# Patient Record
Sex: MALE | Race: OTHER
[De-identification: names, ages, dates, MRNs, and addresses within clinical notes are randomized per-mention and may not be internally consistent; named-entity substitution may affect disease eponyms.]

---

## 2018-03-06 ENCOUNTER — HOSPITAL ENCOUNTER (OUTPATIENT)
Dept: HOSPITAL 89 - OR | Age: 58
Discharge: HOME | End: 2018-03-06
Attending: SURGERY
Payer: COMMERCIAL

## 2018-03-06 VITALS — SYSTOLIC BLOOD PRESSURE: 122 MMHG | DIASTOLIC BLOOD PRESSURE: 92 MMHG

## 2018-03-06 VITALS — SYSTOLIC BLOOD PRESSURE: 135 MMHG | DIASTOLIC BLOOD PRESSURE: 97 MMHG

## 2018-03-06 VITALS — DIASTOLIC BLOOD PRESSURE: 94 MMHG | SYSTOLIC BLOOD PRESSURE: 131 MMHG

## 2018-03-06 VITALS — DIASTOLIC BLOOD PRESSURE: 95 MMHG | SYSTOLIC BLOOD PRESSURE: 135 MMHG

## 2018-03-06 VITALS — DIASTOLIC BLOOD PRESSURE: 100 MMHG | SYSTOLIC BLOOD PRESSURE: 131 MMHG

## 2018-03-06 VITALS — WEIGHT: 176 LBS | HEIGHT: 69 IN | BODY MASS INDEX: 26.07 KG/M2

## 2018-03-06 VITALS — DIASTOLIC BLOOD PRESSURE: 96 MMHG | SYSTOLIC BLOOD PRESSURE: 128 MMHG

## 2018-03-06 DIAGNOSIS — K63.5: ICD-10-CM

## 2018-03-06 DIAGNOSIS — Z12.11: Primary | ICD-10-CM

## 2018-03-06 LAB — PLATELET COUNT, AUTOMATED: 202 K/UL (ref 150–450)

## 2018-03-06 PROCEDURE — 85025 COMPLETE CBC W/AUTO DIFF WBC: CPT

## 2018-03-06 PROCEDURE — 36415 COLL VENOUS BLD VENIPUNCTURE: CPT

## 2018-03-06 PROCEDURE — 00811 ANES LWR INTST NDSC NOS: CPT

## 2018-03-06 PROCEDURE — 88305 TISSUE EXAM BY PATHOLOGIST: CPT

## 2018-03-06 PROCEDURE — 49505 PRP I/HERN INIT REDUC >5 YR: CPT

## 2018-03-06 PROCEDURE — 45380 COLONOSCOPY AND BIOPSY: CPT

## 2018-03-06 NOTE — POST OPERATIVE PROGRESS NOTE
Post Operative Progress Note


Date:  Mar 6, 2018


Time:  08:23


Surgeon:  


stacey


Anesthesia:  


dr baca


Pre-Op Diagnosis:  


right inguinal hernia and screening colonoscopy


Post-Op Diagnosis:  


direct inguinal hernia and 2 mm polyp in right colon


Procedure(s):  


right inguinal hernia repair, kugel and colonoscopy with polypectomy











CHANDAN NJ MD Mar 6, 2018 08:24

## 2018-03-06 NOTE — OPERATIVE REPORT 1
EVENT DATE:  March 6, 2018

SURGEON:  Martín Sandy MD

ANESTHESIOLOGIST:  Demond Aguilar MD

ANESTHESIA:  General.





PREOPERATIVE DIAGNOSIS  

Right inguinal hernia.



POSTOPERATIVE DIAGNOSIS 

Right direct inguinal hernia.



PROCEDURE PERFORMED 

Right inguinal hernia repair, Kugel technique.



DESCRIPTION OF PROCEDURE 

The patient was placed in the supine position and given a general anesthetic.  
After the colonoscopy, the lower abdomen and genitalia were prepped and draped 
in a sterile fashion.  We made a skin incision half way between the anterior 
superior iliac spine and pubic tubercle, 1 cm above this.  Following skin lines
, it was carried down through the skin and subcutaneous tissue to the muscle-
splitting incision through the external and internal oblique and incised the 
transversalis fascia vertically.  We raised the inferior epigastric vessels, 
dissecting the preperitoneal space.  We dissected out the pubic tubercle and 
Eitan ligament.  He had a direct inguinal hernia that was dissected out.  We 
created room for the mesh with blunt dissection.  We dissected the peritoneum 
off the cord structures back past where the vas deferens and the cord 
structures bifurcated.  When we put tension on the testicle, there was no 
tension on the peritoneum.  We then laid a medium Kugel patch into position and 
covered the pubic tubercle and Eitan ligament and extended past the internal 
ring.  There were no sharp angulations.  The transversalis fascia was then 
reapproximated with interrupted 3-0 Vicryl incorporating the mesh.  The 
internal oblique was closed with running 3-0 Vicryl.  The external oblique was 
closed with running 3-0 Vicryl.  We injected 20 mL of 0.2% ropivacaine in the 
muscle and subcutaneous tissue.  The subcutaneous tissue was reapproximated 
with 4-0 Maxon, and the skin was closed with interrupted 4-0 Maxon.  Steri-
Strips and an Airstrip were placed.  The patient tolerated the procedure well 
with no apparent complications.  
[facility]

## 2018-03-06 NOTE — SHORT(OUTPT) DISCHARGE SUMMARY
Discharge Summary


Reason for Hosp/Final Diag:  


(1) Right inguinal hernia


Hospital Course & Plan:  direct inguinal hernia repaired with kugel mesh





(2) Encounter for screening colonoscopy


Hospital Course & Plan:  2 mm polyp in the right colon





Departure


Discharge to:  Home





Discharge Instructions


Home Meds


Active Scripts


Hydrocodone Bit/Acetaminophen (NORCO 5-325 TABLET) 1 Each Tablet, 1 EACH PO Q4H 

Y for PAIN, #30 TAB


   Prov:CHANDAN NJ MD         3/6/18


Ketorolac Tromethamine (KETOROLAC TROMETHAMINE) 10 Mg Tab, 10 MG PO Q6H, #20 TAB


   Prov:CHANDAN NJ MD         3/6/18


Diet:  Regular


Activity:  No Heavy Lifting


Special Instructions:  


ice to incision for 48 hours


remove bandage and shower thursday


to see me in one week, call [phone] for apt











CHANDAN NJ MD Mar 6, 2018 08:27

## 2018-03-06 NOTE — OPERATIVE REPORT 1
EVENT DATE:  March 6, 2018

SURGEON:  Martín Sandy MD

ANESTHESIOLOGIST:  Demond Aguilar mD

ANESTHESIA:  General.





PREOPERATIVE DIAGNOSIS  

Screening colonoscopy.



POSTOPERATIVE DIAGNOSIS 

A 2 mm polyp in the right colon.



PROCEDURE PERFORMED 

Colonoscopy with polypectomy.



DESCRIPTION OF PROCEDURE 

The patient was placed in the supine position and given general anesthetic.  He 
was placed in the frogleg position.  Digital exam was unremarkable.  A flexible 
colonoscope was inserted and advanced to the cecum.  He had an excellent bowel 
prep.  Ileocecal valve, base of the cecum, and appendiceal orifice were 
identified.  The scope was slowly withdrawn.  Care was taken to look behind the 
haustral folds.  No abnormalities were noted in the cecum.  In the distal right 
colon, he had a small, 1 to 2 mm projection.  This was removed with the cold 
cup.  The rest of the right colon, transverse, descending, or sigmoid colon 
were normal.  Rectum was normal.  The scope was retroflexed, and that appeared 
to be normal.   



The patient will require a repeat colonoscopy in five years if that polyp is 
adenomatous.
ROSALINDA

## 2020-01-28 NOTE — NACHTIGAL H&P
DATE OF ADMISSION:  March 6, 2018



CHIEF COMPLAINT

Right inguinal hernia.



HISTORY OF PRESENT ILLNESS

This is a 57-year-old male with a right inguinal hernia.  He has had it for a 
few years.  It is gradually getting larger, causes him some pain occasionally.  
He is also interested in a screening colonoscopy.  He has had no change in his 
bowel patterns, no family history of colon cancer.  



ALLERGIES

He has no known allergies.  



CURRENT MEDICATIONS 

1.  Fluticasone spray.

2.  Ibuprofen.

3.  Tramadol.



PAST MEDICAL HISTORY/OPERATIONS

None.



REVIEW OF SYSTEMS

No cardiac, pulmonary, liver or kidney disease, diabetes or hypertension or 
history of deep venous thrombosis.  



PHYSICAL EXAMINATION

LUNGS:  Clear.

HEART:  Regular rhythm.  

INGUINAL EXAM:  He has a reducible right inguinal hernia.  



IMPRESSION

Right inguinal hernia and screening colonoscopy.  



PLAN

Will do a colonoscopy and repair the hernia with mesh.  We discussed the 
procedure, complications and recovery time.  He seems to understand and wishes 
to proceed.  

ROSALINDA
[Negative] : Heme/Lymph